# Patient Record
Sex: FEMALE | Race: WHITE | NOT HISPANIC OR LATINO | Employment: UNEMPLOYED | URBAN - METROPOLITAN AREA
[De-identification: names, ages, dates, MRNs, and addresses within clinical notes are randomized per-mention and may not be internally consistent; named-entity substitution may affect disease eponyms.]

---

## 2024-08-16 ENCOUNTER — LAB (OUTPATIENT)
Dept: LAB | Facility: LAB | Age: 64
End: 2024-08-16
Payer: OTHER GOVERNMENT

## 2024-08-16 ENCOUNTER — OFFICE VISIT (OUTPATIENT)
Dept: CARDIOLOGY | Facility: HOSPITAL | Age: 64
End: 2024-08-16
Payer: OTHER GOVERNMENT

## 2024-08-16 VITALS
HEART RATE: 71 BPM | HEIGHT: 65 IN | OXYGEN SATURATION: 92 % | DIASTOLIC BLOOD PRESSURE: 80 MMHG | SYSTOLIC BLOOD PRESSURE: 119 MMHG | WEIGHT: 223 LBS | BODY MASS INDEX: 37.15 KG/M2

## 2024-08-16 DIAGNOSIS — I48.91 ATRIAL FIBRILLATION, UNSPECIFIED TYPE (MULTI): Primary | ICD-10-CM

## 2024-08-16 DIAGNOSIS — I48.91 ATRIAL FIBRILLATION, UNSPECIFIED TYPE (MULTI): ICD-10-CM

## 2024-08-16 PROBLEM — J45.909 ASTHMA (HHS-HCC): Status: ACTIVE | Noted: 2024-08-16

## 2024-08-16 PROBLEM — D24.2 FIBROADENOMA OF LEFT BREAST: Status: ACTIVE | Noted: 2024-08-16

## 2024-08-16 LAB
ALBUMIN SERPL BCP-MCNC: 4.1 G/DL (ref 3.4–5)
ALP SERPL-CCNC: 82 U/L (ref 33–136)
ALT SERPL W P-5'-P-CCNC: 13 U/L (ref 7–45)
ANION GAP SERPL CALC-SCNC: 15 MMOL/L (ref 10–20)
AST SERPL W P-5'-P-CCNC: 16 U/L (ref 9–39)
BILIRUB SERPL-MCNC: 0.5 MG/DL (ref 0–1.2)
BUN SERPL-MCNC: 14 MG/DL (ref 6–23)
CALCIUM SERPL-MCNC: 9.5 MG/DL (ref 8.6–10.6)
CHLORIDE SERPL-SCNC: 105 MMOL/L (ref 98–107)
CO2 SERPL-SCNC: 25 MMOL/L (ref 21–32)
CREAT SERPL-MCNC: 0.87 MG/DL (ref 0.5–1.05)
EGFRCR SERPLBLD CKD-EPI 2021: 75 ML/MIN/1.73M*2
ERYTHROCYTE [DISTWIDTH] IN BLOOD BY AUTOMATED COUNT: 14.8 % (ref 11.5–14.5)
GLUCOSE SERPL-MCNC: 92 MG/DL (ref 74–99)
HCT VFR BLD AUTO: 40.9 % (ref 36–46)
HGB BLD-MCNC: 12.9 G/DL (ref 12–16)
MCH RBC QN AUTO: 27.6 PG (ref 26–34)
MCHC RBC AUTO-ENTMCNC: 31.5 G/DL (ref 32–36)
MCV RBC AUTO: 88 FL (ref 80–100)
NRBC BLD-RTO: 0 /100 WBCS (ref 0–0)
PLATELET # BLD AUTO: 320 X10*3/UL (ref 150–450)
POTASSIUM SERPL-SCNC: 3.9 MMOL/L (ref 3.5–5.3)
PROT SERPL-MCNC: 7 G/DL (ref 6.4–8.2)
RBC # BLD AUTO: 4.67 X10*6/UL (ref 4–5.2)
SODIUM SERPL-SCNC: 141 MMOL/L (ref 136–145)
WBC # BLD AUTO: 7.8 X10*3/UL (ref 4.4–11.3)

## 2024-08-16 PROCEDURE — 80053 COMPREHEN METABOLIC PANEL: CPT

## 2024-08-16 PROCEDURE — 99215 OFFICE O/P EST HI 40 MIN: CPT | Performed by: INTERNAL MEDICINE

## 2024-08-16 PROCEDURE — 99205 OFFICE O/P NEW HI 60 MIN: CPT | Performed by: INTERNAL MEDICINE

## 2024-08-16 PROCEDURE — 93005 ELECTROCARDIOGRAM TRACING: CPT | Performed by: INTERNAL MEDICINE

## 2024-08-16 PROCEDURE — 85027 COMPLETE CBC AUTOMATED: CPT

## 2024-08-16 PROCEDURE — 36415 COLL VENOUS BLD VENIPUNCTURE: CPT

## 2024-08-16 PROCEDURE — 3008F BODY MASS INDEX DOCD: CPT | Performed by: INTERNAL MEDICINE

## 2024-08-16 RX ORDER — OMEPRAZOLE 20 MG/1
20 TABLET, DELAYED RELEASE ORAL
COMMUNITY

## 2024-08-16 RX ORDER — DOFETILIDE 0.12 MG/1
125 CAPSULE ORAL EVERY 12 HOURS
COMMUNITY

## 2024-08-16 RX ORDER — ACETAMINOPHEN 500 MG
5000 TABLET ORAL DAILY
COMMUNITY

## 2024-08-16 RX ORDER — MONTELUKAST SODIUM 10 MG/1
10 TABLET ORAL NIGHTLY
COMMUNITY

## 2024-08-16 RX ORDER — ALBUTEROL SULFATE 5 MG/ML
2.5 SOLUTION RESPIRATORY (INHALATION) EVERY 6 HOURS PRN
COMMUNITY

## 2024-08-16 RX ORDER — OLOPATADINE HYDROCHLORIDE 1 MG/ML
1 SOLUTION/ DROPS OPHTHALMIC 2 TIMES DAILY
COMMUNITY

## 2024-08-16 RX ORDER — CARBOXYMETHYLCELLULOSE SODIUM 5 MG/ML
1 SOLUTION/ DROPS OPHTHALMIC AS NEEDED
COMMUNITY

## 2024-08-16 RX ORDER — ATORVASTATIN CALCIUM 10 MG/1
10 TABLET, FILM COATED ORAL DAILY
COMMUNITY

## 2024-08-16 RX ORDER — FLUTICASONE PROPIONATE 50 MCG
1 SPRAY, SUSPENSION (ML) NASAL DAILY
COMMUNITY

## 2024-08-16 RX ORDER — METOPROLOL TARTRATE 50 MG/1
50 TABLET ORAL 2 TIMES DAILY
COMMUNITY

## 2024-08-16 RX ORDER — LORATADINE 10 MG/1
10 TABLET ORAL DAILY
COMMUNITY

## 2024-08-16 ASSESSMENT — PAIN SCALES - GENERAL: PAINLEVEL: 0-NO PAIN

## 2024-08-16 NOTE — H&P (VIEW-ONLY)
Referring Provider: Laina Middleton MD  Reason for Consult: Atrial Fibrillation    History of Present Illness:      Avani Estes is a 64 y.o. year old female patient with a history significant for hypertension, paroxysmal atrial fibrillation, obstructive sleep apnea, GERD, anxiety, asthma, who is referred by Dr. Middleton for consideration of A-fib ablation.    [facility].  Her atrial arrhythmias were previously controlled on flecainide, but she eventually began to have breakthrough on flecainide of atrial arrhythmias.  In June 2024, she underwent loading with dofetilide 375 mcg.  [facility] but [facility] were unable to [facility] her until the winter, so she was thus referred here for earlier ablation.  She is interested in moving forward with ablation as soon as possible.  She continues to have palpitations despite being on dofetilide.    Focused Cardiovascular Problem List:  Paroxysmal atrial fibrillation: Currently on dofetilide, apixaban, and metoprolol.  Has failed flecainide.  Has had at least 3 cardioversions.  Typical atrial flutter by report  Hypertension  Obstructive sleep apnea  GERD      Past Medical and Surgical History:  Ms. Estes  has no past medical history on file.    has no past surgical history on file.    Social History:  Social History     Tobacco Use    Smoking status: Not on file    Smokeless tobacco: Not on file   Substance Use Topics    Alcohol use: Not on file      Tobacco: Denies  Alcohol: Denies  Drug use:  Denies      Relevant Family History:   No family history on file.  No relevant family history    Allergies:  Not on File     Medications:  Current Outpatient Medications   Medication Instructions    albuterol 2.5 mg, nebulization, Every 6 hours PRN    apixaban (ELIQUIS) 5 mg, oral, 2 times daily    atorvastatin (LIPITOR) 10 mg, oral, Daily    carboxymethylcellulose (Refresh Plus) 0.5 % ophthalmic  "solution 1 drop, Both Eyes, As needed    cholecalciferol (VITAMIN D-3) 5,000 Units, oral, Daily    dofetilide (TIKOSYN) 125 mcg, oral, Every 12 hours    fluticasone (Flonase) 50 mcg/actuation nasal spray 1 spray, Each Nostril, Daily, Shake gently. Before first use, prime pump. After use, clean tip and replace cap.    loratadine (CLARITIN) 10 mg, oral, Daily    lubricating eye drops ophthalmic solution 1 drop, Both Eyes, As needed    metoprolol tartrate (LOPRESSOR) 50 mg, oral, 2 times daily    montelukast (SINGULAIR) 10 mg, oral, Nightly    olopatadine (Patanol) 0.1 % ophthalmic solution 1 drop, Both Eyes, 2 times daily    omeprazole OTC (PRILOSEC OTC) 20 mg, oral, Daily before breakfast, Do not crush, chew, or split.         Objective   Physical Exam:  Last Recorded Vitals:      8/16/2024    11:53 AM   Vitals   Systolic 119   Diastolic 80   Heart Rate 71   Height (in) 1.651 m (5' 5\")   Weight (lb) 223   BMI 37.11 kg/m2   BSA (m2) 2.15 m2   Visit Report Report    Visit Vitals  /80 (BP Location: Left arm, Patient Position: Sitting)   Pulse 71   Ht 1.651 m (5' 5\")   Wt 101 kg (223 lb)   SpO2 92%   BMI 37.11 kg/m²   BSA 2.15 m²      Gen: NAD, sitting comfortably  HEENT: NC/AT  Card: RRR, no m/r/g  Pulm: Clear to auscultation bilaterally  Ext: No LE edema  Neuro: No focal deficits    Diagnostic Results      My Interpretation of Reviewed Study(s):  Prior ECGs (reviewed and my interpretation):   8/16/2024: Normal sinus rhythm, QTc 477 ms    Echocardiography:  4/2024: Normal LV ejection fraction no significant valvular disease.  Normal left atrial size.        Relevant Labs:  Lab Results   Component Value Date    CREATININE 0.87 08/16/2024    K 3.9 08/16/2024    HGB 12.9 08/16/2024    AST 16 08/16/2024    ALT 13 08/16/2024       Assessment/Plan   Assessment and Plan:  In summary, patient is a pleasant 64 y.o.female with a history of recurrent symptomatic drug-refractory paroxysmal atrial fibrillation diagnosed, has " failed flecainide and now on dofetilide, who presents today for initial consultation. Symptoms include palpitations and shortness of breath. Treatment options of atrial fibrillation were discussed with the patient and all questions were answered. These options included: 1. Rate control drug therapy with AVN blockers to slow down the heart rate, 2. Rhythm control with anti-arrhythmic drugs and with cardioversion to restore normal sinus rhythm, and/or 3. Radiofrequency (RF) ablation. In particular, RF ablation of atrial fibrillation was discussed in detail.    We discussed the potential benefits of AF ablation including freedom from AF without any medical therapy or, in some cases, a significant improvement in AF burden on medical therapy. We also discussed that AF ablation has been shown to be the most effective way to maintain a normal rhythm. Normal rhythm is associated with less heart failure hospitalizations, development of congestive heart failure, major adverse cardiac events, and cognitive dysfunction.     We also discussed that ablation procedure is not indicated for the purpose of discontinuing oral anticoagulation, and the success rate for catheter ablation for AF, meaning no recurrence of any atrial fibrillation, is currently approximately 70-80% for paroxysmal atrial fibrillation. A minority of patients may require an additional touch-up procedure. However, it is highly successful at reducing overall A-fib burden as well as symptomatic recurrences. The risks of the procedure were also extensively discussed, including but not limited to infection, blood vessel damage, heart injury or perforation necessitating emergency cardiac surgery, heart attack or stroke, atrial-esophageal fistula, pulmonary vein stenosis, phrenic nerve injury, even death.      Finally, the risk of stroke associated with atrial fibrillation was discussed - as the patient has a [id number]-ZCWo score of  2 , the patient will be maintained  on apixaban for CVA prevention.    After extensive discussion, Ms. Estes wishes to proceed with catheter ablation for atrial fibrillation.  We will stop dofetilide after the ablation.  If she does have recurrent atrial fibrillation during the blanking period, we could consider restarting flecainide.    Name: Avani Estes  MRN: [id number]  Attending: Nate Morfin MD  Procedure: Atrial fibrillation and atrial flutter ablation  Date desired: 8/28/2024  Diagnosis: Atrial fibrillation and typical atrial flutter  Vendor if applicable: [employer]  Expected anesthesia: General  Isolation/precautions?:  None  Other comments/med instructions: Continue apixaban uninterrupted.  Hold all other medications the morning of the procedure.         Return to Clinic:  After ablation    Thank you very much for allowing me to participate in the care of this patient. Please do not hesitate to contact me with any further questions or concerns.    Nate Morfin MD  Clinical Cardiac Electrophysiologist, [facility] [place]    of Medicine, [employer] [facility] [employer]  Director of Atrial Fibrillation Ablation, [facility]  Director of Ventricular Arrhythmias Research, [facility]  Office Phone Number: [phone]

## 2024-08-16 NOTE — PROGRESS NOTES
Referring Provider: Laina Middleton MD  Reason for Consult: Atrial Fibrillation    History of Present Illness:      Avani Estes is a 64 y.o. year old female patient with a history significant for hypertension, paroxysmal atrial fibrillation, obstructive sleep apnea, GERD, anxiety, asthma, who is referred by Dr. Middleton for consideration of A-fib ablation.    [facility].  Her atrial arrhythmias were previously controlled on flecainide, but she eventually began to have breakthrough on flecainide of atrial arrhythmias.  In June 2024, she underwent loading with dofetilide 375 mcg.  [facility] but [facility] were unable to accommodate her until the winter, so she was thus referred here for earlier ablation.  She is interested in moving forward with ablation as soon as possible.  She continues to have palpitations despite being on dofetilide.    Focused Cardiovascular Problem List:  Paroxysmal atrial fibrillation: Currently on dofetilide, apixaban, and metoprolol.  Has failed flecainide.  Has had at least 3 cardioversions.  Typical atrial flutter by report  Hypertension  Obstructive sleep apnea  GERD      Past Medical and Surgical History:  Ms. Estes  has no past medical history on file.    has no past surgical history on file.    Social History:  Social History     Tobacco Use    Smoking status: Not on file    Smokeless tobacco: Not on file   Substance Use Topics    Alcohol use: Not on file      Tobacco: Denies  Alcohol: Denies  Drug use:  Denies      Relevant Family History:   No family history on file.  No relevant family history    Allergies:  Not on File     Medications:  Current Outpatient Medications   Medication Instructions    albuterol 2.5 mg, nebulization, Every 6 hours PRN    apixaban (ELIQUIS) 5 mg, oral, 2 times daily    atorvastatin (LIPITOR) 10 mg, oral, Daily    carboxymethylcellulose (Refresh Plus) 0.5 % ophthalmic  "solution 1 drop, Both Eyes, As needed    cholecalciferol (VITAMIN D-3) 5,000 Units, oral, Daily    dofetilide (TIKOSYN) 125 mcg, oral, Every 12 hours    fluticasone (Flonase) 50 mcg/actuation nasal spray 1 spray, Each Nostril, Daily, Shake gently. Before first use, prime pump. After use, clean tip and replace cap.    loratadine (CLARITIN) 10 mg, oral, Daily    lubricating eye drops ophthalmic solution 1 drop, Both Eyes, As needed    metoprolol tartrate (LOPRESSOR) 50 mg, oral, 2 times daily    montelukast (SINGULAIR) 10 mg, oral, Nightly    olopatadine (Patanol) 0.1 % ophthalmic solution 1 drop, Both Eyes, 2 times daily    omeprazole OTC (PRILOSEC OTC) 20 mg, oral, Daily before breakfast, Do not crush, chew, or split.         Objective   Physical Exam:  Last Recorded Vitals:      8/16/2024    11:53 AM   Vitals   Systolic 119   Diastolic 80   Heart Rate 71   Height (in) 1.651 m (5' 5\")   Weight (lb) 223   BMI 37.11 kg/m2   BSA (m2) 2.15 m2   Visit Report Report    Visit Vitals  /80 (BP Location: Left arm, Patient Position: Sitting)   Pulse 71   Ht 1.651 m (5' 5\")   Wt 101 kg (223 lb)   SpO2 92%   BMI 37.11 kg/m²   BSA 2.15 m²      Gen: NAD, sitting comfortably  HEENT: NC/AT  Card: RRR, no m/r/g  Pulm: Clear to auscultation bilaterally  Ext: No LE edema  Neuro: No focal deficits    Diagnostic Results      My Interpretation of Reviewed Study(s):  Prior ECGs (reviewed and my interpretation):   8/16/2024: Normal sinus rhythm, QTc 477 ms    Echocardiography:  4/2024: Normal LV ejection fraction no significant valvular disease.  Normal left atrial size.        Relevant Labs:  Lab Results   Component Value Date    CREATININE 0.87 08/16/2024    K 3.9 08/16/2024    HGB 12.9 08/16/2024    AST 16 08/16/2024    ALT 13 08/16/2024       Assessment/Plan   Assessment and Plan:  In summary, patient is a pleasant 64 y.o.female with a history of recurrent symptomatic drug-refractory paroxysmal atrial fibrillation diagnosed, has " failed flecainide and now on dofetilide, who presents today for initial consultation. Symptoms include palpitations and shortness of breath. Treatment options of atrial fibrillation were discussed with the patient and all questions were answered. These options included: 1. Rate control drug therapy with AVN blockers to slow down the heart rate, 2. Rhythm control with anti-arrhythmic drugs and with cardioversion to restore normal sinus rhythm, and/or 3. Radiofrequency (RF) ablation. In particular, RF ablation of atrial fibrillation was discussed in detail.    We discussed the potential benefits of AF ablation including freedom from AF without any medical therapy or, in some cases, a significant improvement in AF burden on medical therapy. We also discussed that AF ablation has been shown to be the most effective way to maintain a normal rhythm. Normal rhythm is associated with less heart failure hospitalizations, development of congestive heart failure, major adverse cardiac events, and cognitive dysfunction.     We also discussed that ablation procedure is not indicated for the purpose of discontinuing oral anticoagulation, and the success rate for catheter ablation for AF, meaning no recurrence of any atrial fibrillation, is currently approximately 70-80% for paroxysmal atrial fibrillation. A minority of patients may require an additional touch-up procedure. However, it is highly successful at reducing overall A-fib burden as well as symptomatic recurrences. The risks of the procedure were also extensively discussed, including but not limited to infection, blood vessel damage, heart injury or perforation necessitating emergency cardiac surgery, heart attack or stroke, atrial-esophageal fistula, pulmonary vein stenosis, phrenic nerve injury, even death.      Finally, the risk of stroke associated with atrial fibrillation was discussed - as the patient has a [id number] score of  2 , the patient will be maintained  on apixaban for CVA prevention.    After extensive discussion, Ms. Estes wishes to proceed with catheter ablation for atrial fibrillation.  We will stop dofetilide after the ablation.  If she does have recurrent atrial fibrillation during the blanking period, we could consider restarting flecainide.    Name: Avani Estes  MRN: [id number]  Attending: Nate Morfin MD  Procedure: Atrial fibrillation and atrial flutter ablation  Date desired: 8/28/2024  Diagnosis: Atrial fibrillation and typical atrial flutter  Vendor if applicable: [employer]  Expected anesthesia: General  Isolation/precautions?:  None  Other comments/med instructions: Continue apixaban uninterrupted.  Hold all other medications the morning of the procedure.         Return to Clinic:  After ablation    Thank you very much for allowing me to participate in the care of this patient. Please do not hesitate to contact me with any further questions or concerns.    Nate Morfin MD  Clinical Cardiac Electrophysiologist, [facility] [place]    of Medicine, [employer]  Director of Atrial Fibrillation Ablation, [facility]  Director of Ventricular Arrhythmias Research, [facility]  Office Phone Number: [phone]

## 2024-08-19 LAB
ATRIAL RATE: 67 BPM
P AXIS: 70 DEGREES
P OFFSET: 166 MS
P ONSET: 122 MS
PR INTERVAL: 166 MS
Q ONSET: 205 MS
QRS COUNT: 12 BEATS
QRS DURATION: 88 MS
QT INTERVAL: 452 MS
QTC CALCULATION(BAZETT): 477 MS
QTC FREDERICIA: 469 MS
R AXIS: 14 DEGREES
T AXIS: 23 DEGREES
T OFFSET: 431 MS
VENTRICULAR RATE: 67 BPM

## 2024-08-27 ENCOUNTER — ANESTHESIA EVENT (OUTPATIENT)
Dept: CARDIOLOGY | Facility: HOSPITAL | Age: 64
End: 2024-08-27
Payer: OTHER GOVERNMENT

## 2024-08-28 ENCOUNTER — HOSPITAL ENCOUNTER (OUTPATIENT)
Facility: HOSPITAL | Age: 64
Setting detail: OUTPATIENT SURGERY
Discharge: HOME | End: 2024-08-28
Attending: INTERNAL MEDICINE | Admitting: INTERNAL MEDICINE
Payer: OTHER GOVERNMENT

## 2024-08-28 ENCOUNTER — ANESTHESIA (OUTPATIENT)
Dept: CARDIOLOGY | Facility: HOSPITAL | Age: 64
End: 2024-08-28
Payer: OTHER GOVERNMENT

## 2024-08-28 VITALS
HEIGHT: 65 IN | SYSTOLIC BLOOD PRESSURE: 156 MMHG | BODY MASS INDEX: 37.49 KG/M2 | HEART RATE: 71 BPM | DIASTOLIC BLOOD PRESSURE: 111 MMHG | OXYGEN SATURATION: 96 % | WEIGHT: 225 LBS

## 2024-08-28 DIAGNOSIS — I48.91 ATRIAL FIBRILLATION, UNSPECIFIED TYPE (MULTI): ICD-10-CM

## 2024-08-28 PROBLEM — E78.5 HYPERLIPIDEMIA: Status: ACTIVE | Noted: 2024-08-28

## 2024-08-28 PROBLEM — F32.A DEPRESSION: Status: ACTIVE | Noted: 2024-08-28

## 2024-08-28 PROBLEM — F41.9 ANXIETY: Status: ACTIVE | Noted: 2024-08-28

## 2024-08-28 PROBLEM — K21.9 GASTROESOPHAGEAL REFLUX DISEASE WITHOUT ESOPHAGITIS: Status: ACTIVE | Noted: 2024-08-28

## 2024-08-28 PROBLEM — R94.31 ABNORMAL EKG: Status: ACTIVE | Noted: 2024-08-28

## 2024-08-28 PROBLEM — G47.33 OSA (OBSTRUCTIVE SLEEP APNEA): Status: ACTIVE | Noted: 2024-08-28

## 2024-08-28 LAB
ACT BLD: 335 SEC (ref 82–174)
ACT BLD: 337 SEC (ref 82–174)
ACT BLD: 352 SEC (ref 82–174)
ACT BLD: 353 SEC (ref 82–174)
ACT BLD: 353 SEC (ref 82–174)

## 2024-08-28 PROCEDURE — 2500000005 HC RX 250 GENERAL PHARMACY W/O HCPCS: Performed by: ANESTHESIOLOGIST ASSISTANT

## 2024-08-28 PROCEDURE — 93655 ICAR CATH ABLTJ DSCRT ARRHYT: CPT | Performed by: INTERNAL MEDICINE

## 2024-08-28 PROCEDURE — 7100000009 HC PHASE TWO TIME - INITIAL BASE CHARGE: Performed by: INTERNAL MEDICINE

## 2024-08-28 PROCEDURE — 3700000001 HC GENERAL ANESTHESIA TIME - INITIAL BASE CHARGE: Performed by: INTERNAL MEDICINE

## 2024-08-28 PROCEDURE — C1732 CATH, EP, DIAG/ABL, 3D/VECT: HCPCS | Performed by: INTERNAL MEDICINE

## 2024-08-28 PROCEDURE — 93656 COMPRE EP EVAL ABLTJ ATR FIB: CPT | Performed by: INTERNAL MEDICINE

## 2024-08-28 PROCEDURE — 2500000004 HC RX 250 GENERAL PHARMACY W/ HCPCS (ALT 636 FOR OP/ED): Performed by: ANESTHESIOLOGIST ASSISTANT

## 2024-08-28 PROCEDURE — 7100000010 HC PHASE TWO TIME - EACH INCREMENTAL 1 MINUTE: Performed by: INTERNAL MEDICINE

## 2024-08-28 PROCEDURE — 2500000004 HC RX 250 GENERAL PHARMACY W/ HCPCS (ALT 636 FOR OP/ED): Performed by: INTERNAL MEDICINE

## 2024-08-28 PROCEDURE — C1760 CLOSURE DEV, VASC: HCPCS | Performed by: INTERNAL MEDICINE

## 2024-08-28 PROCEDURE — 76937 US GUIDE VASCULAR ACCESS: CPT | Performed by: INTERNAL MEDICINE

## 2024-08-28 PROCEDURE — 85347 COAGULATION TIME ACTIVATED: CPT

## 2024-08-28 PROCEDURE — 2720000007 HC OR 272 NO HCPCS: Performed by: INTERNAL MEDICINE

## 2024-08-28 PROCEDURE — C1759 CATH, INTRA ECHOCARDIOGRAPHY: HCPCS | Performed by: INTERNAL MEDICINE

## 2024-08-28 PROCEDURE — 85347 COAGULATION TIME ACTIVATED: CPT | Performed by: INTERNAL MEDICINE

## 2024-08-28 PROCEDURE — C1766 INTRO/SHEATH,STRBLE,NON-PEEL: HCPCS | Performed by: INTERNAL MEDICINE

## 2024-08-28 PROCEDURE — C1730 CATH, EP, 19 OR FEW ELECT: HCPCS | Performed by: INTERNAL MEDICINE

## 2024-08-28 PROCEDURE — 3700000002 HC GENERAL ANESTHESIA TIME - EACH INCREMENTAL 1 MINUTE: Performed by: INTERNAL MEDICINE

## 2024-08-28 PROCEDURE — P9045 ALBUMIN (HUMAN), 5%, 250 ML: HCPCS | Mod: JZ | Performed by: ANESTHESIOLOGIST ASSISTANT

## 2024-08-28 PROCEDURE — G0269 OCCLUSIVE DEVICE IN VEIN ART: HCPCS | Mod: 59 | Performed by: INTERNAL MEDICINE

## 2024-08-28 PROCEDURE — 2780000003 HC OR 278 NO HCPCS: Performed by: INTERNAL MEDICINE

## 2024-08-28 PROCEDURE — 2500000004 HC RX 250 GENERAL PHARMACY W/ HCPCS (ALT 636 FOR OP/ED): Performed by: STUDENT IN AN ORGANIZED HEALTH CARE EDUCATION/TRAINING PROGRAM

## 2024-08-28 RX ORDER — ACETAMINOPHEN 10 MG/ML
INJECTION, SOLUTION INTRAVENOUS AS NEEDED
Status: DISCONTINUED | OUTPATIENT
Start: 2024-08-28 | End: 2024-08-28

## 2024-08-28 RX ORDER — FUROSEMIDE 10 MG/ML
20 INJECTION INTRAMUSCULAR; INTRAVENOUS ONCE
Status: COMPLETED | OUTPATIENT
Start: 2024-08-28 | End: 2024-08-28

## 2024-08-28 RX ORDER — HEPARIN SODIUM 10000 [USP'U]/100ML
INJECTION, SOLUTION INTRAVENOUS CONTINUOUS PRN
Status: DISCONTINUED | OUTPATIENT
Start: 2024-08-28 | End: 2024-08-28 | Stop reason: HOSPADM

## 2024-08-28 RX ORDER — PHENYLEPHRINE 10 MG/250 ML(40 MCG/ML)IN 0.9 % SOD.CHLORIDE INTRAVENOUS
CONTINUOUS PRN
Status: DISCONTINUED | OUTPATIENT
Start: 2024-08-28 | End: 2024-08-28

## 2024-08-28 RX ORDER — PHENYLEPHRINE HCL IN 0.9% NACL 0.4MG/10ML
SYRINGE (ML) INTRAVENOUS AS NEEDED
Status: DISCONTINUED | OUTPATIENT
Start: 2024-08-28 | End: 2024-08-28

## 2024-08-28 RX ORDER — PROTAMINE SULFATE 10 MG/ML
INJECTION, SOLUTION INTRAVENOUS AS NEEDED
Status: DISCONTINUED | OUTPATIENT
Start: 2024-08-28 | End: 2024-08-28

## 2024-08-28 RX ORDER — ALBUMIN HUMAN 50 G/1000ML
SOLUTION INTRAVENOUS AS NEEDED
Status: DISCONTINUED | OUTPATIENT
Start: 2024-08-28 | End: 2024-08-28

## 2024-08-28 RX ORDER — LIDOCAINE HYDROCHLORIDE 20 MG/ML
INJECTION, SOLUTION INFILTRATION; PERINEURAL AS NEEDED
Status: DISCONTINUED | OUTPATIENT
Start: 2024-08-28 | End: 2024-08-28

## 2024-08-28 RX ORDER — ONDANSETRON HYDROCHLORIDE 2 MG/ML
INJECTION, SOLUTION INTRAVENOUS AS NEEDED
Status: DISCONTINUED | OUTPATIENT
Start: 2024-08-28 | End: 2024-08-28

## 2024-08-28 RX ORDER — PANTOPRAZOLE SODIUM 40 MG/1
40 TABLET, DELAYED RELEASE ORAL 2 TIMES DAILY
Qty: 60 TABLET | Refills: 0 | Status: SHIPPED | OUTPATIENT
Start: 2024-08-28 | End: 2024-09-27

## 2024-08-28 RX ORDER — ROCURONIUM BROMIDE 10 MG/ML
INJECTION, SOLUTION INTRAVENOUS AS NEEDED
Status: DISCONTINUED | OUTPATIENT
Start: 2024-08-28 | End: 2024-08-28

## 2024-08-28 RX ORDER — MIDAZOLAM HYDROCHLORIDE 1 MG/ML
INJECTION INTRAMUSCULAR; INTRAVENOUS AS NEEDED
Status: DISCONTINUED | OUTPATIENT
Start: 2024-08-28 | End: 2024-08-28

## 2024-08-28 RX ORDER — PROPOFOL 10 MG/ML
INJECTION, EMULSION INTRAVENOUS AS NEEDED
Status: DISCONTINUED | OUTPATIENT
Start: 2024-08-28 | End: 2024-08-28

## 2024-08-28 RX ORDER — ESMOLOL HYDROCHLORIDE 10 MG/ML
INJECTION INTRAVENOUS AS NEEDED
Status: DISCONTINUED | OUTPATIENT
Start: 2024-08-28 | End: 2024-08-28

## 2024-08-28 RX ORDER — FENTANYL CITRATE 50 UG/ML
INJECTION, SOLUTION INTRAMUSCULAR; INTRAVENOUS AS NEEDED
Status: DISCONTINUED | OUTPATIENT
Start: 2024-08-28 | End: 2024-08-28

## 2024-08-28 RX ORDER — HEPARIN SODIUM 1000 [USP'U]/ML
INJECTION, SOLUTION INTRAVENOUS; SUBCUTANEOUS AS NEEDED
Status: DISCONTINUED | OUTPATIENT
Start: 2024-08-28 | End: 2024-08-28 | Stop reason: HOSPADM

## 2024-08-28 SDOH — HEALTH STABILITY: MENTAL HEALTH: CURRENT SMOKER: 0

## 2024-08-28 ASSESSMENT — COLUMBIA-SUICIDE SEVERITY RATING SCALE - C-SSRS
1. IN THE PAST MONTH, HAVE YOU WISHED YOU WERE DEAD OR WISHED YOU COULD GO TO SLEEP AND NOT WAKE UP?: NO
2. HAVE YOU ACTUALLY HAD ANY THOUGHTS OF KILLING YOURSELF?: NO
6. HAVE YOU EVER DONE ANYTHING, STARTED TO DO ANYTHING, OR PREPARED TO DO ANYTHING TO END YOUR LIFE?: NO

## 2024-08-28 NOTE — ANESTHESIA PROCEDURE NOTES
Arterial Line:    Date/Time: 8/28/2024 12:50 PM    Staffing  Performed: CAA   Authorized by: Morales Martinez MD    Performed by: LAYA Ornelas    An arterial line was placed. Procedure performed using surface landmarks.in the OR for the following indication(s): continuous blood pressure monitoring.    A 20 gauge (size), 1 and 3/4 inch (length), Arrow (type) catheter was placed into the Left radial artery, secured by T[place]arita   Seldinger technique used.  Events:  patient tolerated procedure well with no complications.      Additional notes:  Arterial line inserted easily/atraumatically, no complications noted.

## 2024-08-28 NOTE — ANESTHESIA PROCEDURE NOTES
Airway  Date/Time: 8/28/2024 12:41 PM  Urgency: elective    Airway not difficult    Staffing  Performed: LAYA   Authorized by: Morales Martinez MD    Performed by: LAYA Ornelas  Patient location during procedure: OR    Indications and Patient Condition  Indications for airway management: anesthesia  Spontaneous Ventilation: absent  Sedation level: deep  Preoxygenated: yes  Patient position: sniffing  Mask difficulty assessment: 1 - vent by mask    Final Airway Details  Final airway type: endotracheal airway      Successful airway: ETT  Cuffed: yes   Successful intubation technique: direct laryngoscopy  Endotracheal tube insertion site: oral  Blade: Kathleen  Blade size: #3  ETT size (mm): 7.0  Cormack-Lehane Classification: grade I - full view of glottis  Placement verified by: capnometry and palpation of cuff   Cuff volume (mL): 6  Measured from: lips  ETT to lips (cm): 23  Number of attempts at approach: 1  Ventilation between attempts: none  Number of other approaches attempted: 0

## 2024-08-28 NOTE — ANESTHESIA PREPROCEDURE EVALUATION
"Patient: Avani Estes    Procedure Information       Date/Time: 08/28/24 1230    Procedure: Ablation A-Fib - carto    [place] [facility] [place] [facility]23 Cardiac Cath Lab    Providers: Nate Morfin MD            Relevant Problems   Cardiac   (+) Abnormal EKG (EKG showed long QT with fusion complexes)   (+) Atrial fibrillation (Multi)   (+) Hyperlipidemia      Pulmonary  Hx chronic sinusitis/rhinitis  Hx throat pain  Hx smoker   (+) Asthma (HHS-HCC) (Hx Reactive Airway Disease)   (+) FAUZIA (obstructive sleep apnea)      Neuro   (+) Anxiety   (+) Depression      GI  Hx internal bleeding hemorrhoids     (+) Gastroesophageal reflux disease without esophagitis      GYN   (-) Breast cancer (Multi) (Hx Left breast fibroadenoma s/p excision)     Hx chronic back/neck pain;  Occasional neuropathy of B fingers  Moderate sciatica L>R    Clinical information reviewed:  Eliquis -- last dose day of surgery at 09:00    NPO Detail:  No data recorded     Physical Exam    Airway  Mallampati: II  TM distance: >3 FB  Neck ROM: full     Cardiovascular    Dental   (+) implants     Pulmonary   Breath sounds clear to auscultation     Abdominal   (+) obese  Abdomen: soft  Bowel sounds: normal     Other findings: Several implants in mouth, all dentition solid          Anesthesia Plan    History of general anesthesia?: no  History of complications of general anesthesia?: no    ASA 3     general   (Plan GETA/ PIVx2/ A-line  Hx resistance to anesthetic, \"woke up\" during prior MAC procedures)  The patient is not a current smoker.  Patient was previously instructed to abstain from smoking on day of procedure.  Patient did not smoke on day of procedure.  Education provided regarding risk of obstructive sleep apnea.  intravenous induction   Anesthetic plan and risks discussed with patient.  Use of blood products discussed with patient who consented to blood products.    Plan discussed with CAA and attending.      "

## 2024-08-28 NOTE — POST-PROCEDURE NOTE
Physician Transition of Care Summary  Invasive Cardiovascular Lab    Procedure Date: 8/28/2024  Attending:    * Nate Morfin - Primary  Resident/Fellow/Other Assistant: Surgeons and Role:     * Pb Buckner MD - Fellow    Indications:   Pre-op Diagnosis      * Atrial fibrillation, unspecified type (Multi) [I48.91]    Post-procedure diagnosis:   Post-op Diagnosis     * Atrial fibrillation, unspecified type (Multi) [I48.91]    Procedure(s):   Ablation A-Fib  51490 - MI COMPRE EP EVAL ABLTJ ATR FIB PULM VEIN ISOLATION        Procedure Findings:   Atrial Fibrillation  Typical Atrial Flutter  Mitral Isthmus Flutter  Focal atrial tachcyardia from high RA septum    Description of the Procedure:   RFV acess x2 (8.5Fr x 2)  Patient's presenting rhythm was typical atrial flutter, however converted to atrial fibrillation at the beginning of the case  Transseptal access was performed  Cardioversion to normal sinus rhtyhm, but patient went back into atrial fibrillation  Low voltage noted in the posterior LA   WACA performed with bilateral PVI  Right-sided carinal line performed  Left atrial posterior wall isolation performed next  Given clincial history of CTI flutter, catheter was pulled back and CTI line was performed  During CTI line, the patient went into mitral isthmus flutter  An anterior mitral isthmus line was then created, with eventual bidirectional block  Focal atrial tachycardia occurred and was mapped to the high RA interatrial septum  Ablation here terminated the atrial tachycardia, but patietn then went into AF  S/p DCCVx3 back to normal sinus rhythm  50mg of Protamine given  Perclose x 2 to the groin (one failed)    Complications:   None    Stents/Implants:   Implants       No implant documentation for this case.            Anticoagulation/Antiplatelet Plan:   Continue anticoagulation uninterrupted  Continue dofetilide    Estimated Blood Loss:   5 mL    Anesthesia: General Anesthesia Staff: Anesthesiologist:  Morales Martinez MD  C-AA: LAYA Ornelas    Any Specimen(s) Removed:   No specimens collected during this procedure.    Disposition:   Back home tonight  EKG prior discharge home  20 mg IV lasix      Electronically signed by: Nate Morfin MD, 8/28/2024 4:54 PM

## 2024-08-28 NOTE — ANESTHESIA POSTPROCEDURE EVALUATION
Patient: Avani Estes    Procedure Summary       Date: 08/28/24 Room / Location: [facility] STEREO / [facility] MAT 3529 Cardiac Cath Lab    Anesthesia Start: 1218 Anesthesia Stop:     Procedure: Ablation A-Fib Diagnosis:       Atrial fibrillation, unspecified type (Multi)      (Atrial fibrillation, unspecified type (Multi) [I48.91])    Providers: Nate Morfin MD Responsible Provider: Morales Martinez MD    Anesthesia Type: general ASA Status: 3            Anesthesia Type: general    Vitals Value Taken Time   /68 08/28/24 1649   Temp 36.0 08/28/24 1649   Pulse 62 08/28/24 1649   Resp 14 08/28/24 1649   SpO2 100 08/28/24 1649       Anesthesia Post Evaluation    Patient location during evaluation: PACU  Patient participation: complete - patient participated  Level of consciousness: sleepy but conscious  Pain management: adequate  Airway patency: patent  Cardiovascular status: acceptable  Respiratory status: acceptable  Hydration status: acceptable  Postoperative Nausea and Vomiting: none  Comments: Patient SV on 8 L/min O2 w/SFM.  VSS. Report given to nurse.        No notable events documented.

## 2024-08-28 NOTE — ANESTHESIA PROCEDURE NOTES
Airway  Date/Time: 8/28/2024 12:41 PM  Urgency: elective      Staffing  Performed: LAYA   Authorized by: Morales Martinez MD    Performed by: LAYA Ornelas  Patient location during procedure: OR    Indications and Patient Condition  Indications for airway management: anesthesia  Spontaneous ventilation: present  Sedation level: deep  Preoxygenated: yes  Patient position: sniffing  Mask difficulty assessment: 2 - vent by mask + OA or adjuvant +/- NMBA    Final Airway Details  Final airway type: endotracheal airway      Successful airway: ETT  Cuffed: yes   Successful intubation technique: direct laryngoscopy  Blade: Kathleen  ETT size (mm): 7.0  Cormack-Lehane Classification: grade IIa - partial view of glottis  Placement verified by: chest auscultation and capnometry   Measured from: lips  ETT to lips (cm): 22  Number of attempts at approach: 1  Ventilation between attempts: BVM    Additional Comments  Lips and teeth in preop condition.

## 2024-08-28 NOTE — ANESTHESIA PROCEDURE NOTES
Peripheral IV  Date/Time: 8/28/2024 12:49 PM  Inserted by: Morales Martinez MD    Placement  Needle size: 16 G  Laterality: right  Location: wrist  Local anesthetic: none  Site prep: chlorhexidine  Technique: anatomical landmarks  Attempts: 1

## 2024-08-28 NOTE — DISCHARGE INSTRUCTIONS
INSTRUCTIONS AFTER ABLATION PROCEDURE:    * You will need to continue blood thinner (Eliquis) until instructed otherwise. It is important not to interrupt blood thinner for any reason (other than an emergency) during the first 30 days after ablation.    * You will be on Pantoprazole twice a day (a heartburn medicine) for 4 weeks to protect the esophagus as it can become irritated with ablation. It is very important that you take this medication. [facility] you. After 4 weeks, you can go back to your home omeprazole.    * All other medications will generally remain the same unless you are told otherwise.  Resume taking your home medications today (including blood thinner) as listed on the discharge instructions.    * In the first week post-ablation you should take it easy. No heavy lifting or heavy exercise, no treadmill. You can use the stairs if needed but go slowly and minimize the number of times up and down.    * Some minor bruising is common at each groin access site with minor soreness as if you had banged the area. Bruising may occasionally be seen to extend down the leg. This is normal as is an occasional small quarter sized bump in the area. If larger swelling or more significant pain occurs at the area, please contact the office or go the nearest Emergency Room.    * You may have some minor chest pain for the next week or so. The pain will often worsen with a deep breath and be better when leaning forward. This is pericardial chest pain from the ablation and is generally not of concern. It should resolve within a week although it might increase for a day or so after the ablation.    * If you develop unexplained fevers exceeding 100 degrees anytime within the first 3 weeks post-ablation, you need to contact the office. Low grade fevers of around 99 degrees are common in the first day or so post-ablation.    * Atrial fibrillation (AFib) can recur in all patients who  undergo this ablation for up to 4-8 weeks post-ablation. The ablation itself can cause inflammation (pericarditis) in the atria and this can cause AFib. Some patients will actually experience an increased amount of atrial arrhythmia early after ablation. Approximately 1/3 of patients will have this early recurrence of AFib. Medications should be continued and your heart rate controlled. Nothing else needs be done initially except waiting as in many cases these episodes of AFib will prove self limited.    * Continue to follow up with your primary care physician, primary cardiologist, and any other specialists you normally see.    * No driving for 2 days post procedure (IF you were driving prior to procedure)    *Diet: Heart healthy    Call Provider If:  Breathing faster than normal.     Fever of 100.4 F (38 C) or higher.     Chills.     Any new concerning symptoms.     Passing out.     Patient Instructions, Next 24 hours:  DO NOT drive a car, operate machinery or power tools.  It is recommended that a responsible adult be with you for the first 24 hours.     DO NOT drink any alcoholic drinks or take any non-prescriptive medications that contain alcohol for the first 24 hours.     DO NOT make any important decisions for the first 24 hours.    Activity:  You are advised to go directly home from the hospital.     DO NOT lift anything heavier than 10 pounds for one week, this allows for proper healing of the groin.     No excessive exercise or treadmill use for one week. You may walk and do stairs, slowly.     No sexual activities for 24 hours after you arrive home.    Wound Care:  If slight bleeding should occur at groin site, lie down and have someone apply firm pressure just above the puncture site for 5 minutes.  If it continues or is profuse, call 911. Always notify your doctor if bleeding occurs.     Keep site clean and dry. Let air dry or you may use a simple bandaid.     Gently cleanse the puncture site in your  groin with soap and water only.     You may experience some tenderness, bruising or minimal inflammation.  If you have any concerns, you may contact the EP Lab or if any of these symptoms become excessive, contact your electrophysiologist or go to the emergency room.     No tub baths, soaking, hot tubs, or swimming for one week.     May shower the next day after your procedure.    Other Instructions:  If you have any questions about the effects of the sedative drugs or groin care, call the physician who performed your procedure.    FOLLOW UP:  1) Primary care physician 2 weeks--call to schedule    2) Argelia Cassidy CNP ( Electrophysiology) 1 month after ablation   will notify you of appointment. If you haven't heard in 1 week, please call [phone]

## 2024-10-04 ENCOUNTER — OFFICE VISIT (OUTPATIENT)
Dept: CARDIOLOGY | Facility: HOSPITAL | Age: 64
End: 2024-10-04
Payer: OTHER GOVERNMENT

## 2024-10-04 VITALS
WEIGHT: 220.8 LBS | BODY MASS INDEX: 35.48 KG/M2 | OXYGEN SATURATION: 98 % | SYSTOLIC BLOOD PRESSURE: 137 MMHG | HEART RATE: 72 BPM | DIASTOLIC BLOOD PRESSURE: 86 MMHG | HEIGHT: 66 IN

## 2024-10-04 DIAGNOSIS — I48.91 ATRIAL FIBRILLATION, UNSPECIFIED TYPE (MULTI): Primary | ICD-10-CM

## 2024-10-04 PROCEDURE — 99213 OFFICE O/P EST LOW 20 MIN: CPT | Performed by: INTERNAL MEDICINE

## 2024-10-04 PROCEDURE — G2211 COMPLEX E/M VISIT ADD ON: HCPCS | Performed by: INTERNAL MEDICINE

## 2024-10-04 PROCEDURE — 93005 ELECTROCARDIOGRAM TRACING: CPT | Performed by: INTERNAL MEDICINE

## 2024-10-04 PROCEDURE — 3008F BODY MASS INDEX DOCD: CPT | Performed by: INTERNAL MEDICINE

## 2024-10-04 PROCEDURE — 1036F TOBACCO NON-USER: CPT | Performed by: INTERNAL MEDICINE

## 2024-10-04 RX ORDER — OMEPRAZOLE 20 MG/1
20 TABLET, DELAYED RELEASE ORAL
COMMUNITY
Start: 2024-03-13 | End: 2024-10-04 | Stop reason: SDUPTHER

## 2024-10-04 ASSESSMENT — PAIN SCALES - GENERAL: PAINLEVEL: 0-NO PAIN

## 2024-10-04 ASSESSMENT — PATIENT HEALTH QUESTIONNAIRE - PHQ9
SUM OF ALL RESPONSES TO PHQ9 QUESTIONS 1 AND 2: 0
2. FEELING DOWN, DEPRESSED OR HOPELESS: NOT AT ALL
1. LITTLE INTEREST OR PLEASURE IN DOING THINGS: NOT AT ALL

## 2024-10-04 NOTE — PROGRESS NOTES
Referring Provider: Laina Middleton MD  Reason for Consult: Atrial Fibrillation    History of Present Illness:      Avani Estes is a 64 y.o. year old female patient with a history significant for hypertension, paroxysmal atrial fibrillation, obstructive sleep apnea, GERD, anxiety, asthma, who is referred by Dr. Middleton for consideration of A-fib ablation.    [facility].  Her atrial arrhythmias were previously controlled on flecainide, but she eventually began to have breakthrough on flecainide of atrial arrhythmias.  In June 2024, she underwent loading with dofetilide 375 mcg.  [facility], but [facility] were unable to accommodate her until the winter, so she was thus referred here for earlier ablation.    She underwent successful ablation on 8/28/2024.  She was noted to have scattered areas of low voltage in the posterior wall.  She underwent pulmonary vein isolation, left atrial posterior wall isolation, and then developed mitral isthmus flutter during the procedure, so also underwent an anterior mitral isthmus line ablation.  Furthermore, she developed typical atrial flutter during the procedure, so we performed a CTI line.  Finally, she developed a spontaneous right sided tachycardia originating from the high dayanna terminalis that was also ablated.    In the first few weeks after the procedure, she was having intermittent palpitations and heart rates up to the 140s that would spontaneously terminate.  She also had an episode of bradycardia with heart rates down to the 40s.  Her metoprolol was then cut to 25 mg twice a day with improvement in her heart rates in the 60s.  She is otherwise doing well and for the past few weeks has not had any significant palpitations.    Focused Cardiovascular Problem List:  Paroxysmal atrial fibrillation: Currently on dofetilide, apixaban, and metoprolol.  Has failed flecainide.  Has had at least 3  cardioversions.  Extensive ablation on 8/28/2024 with pulmonary vein isolation, left atrial posterior wall isolation, and then developed mitral isthmus flutter during the procedure, so also underwent an anterior mitral isthmus line ablation.  Furthermore, she developed typical atrial flutter during the procedure, so we performed a CTI line.  Finally, she developed a spontaneous right sided tachycardia originating from the high dayanna terminalis that was also ablated.  Typical atrial flutter by report: Status post CTI line 8/28/2024  Hypertension  Obstructive sleep apnea  GERD      Past Medical and Surgical History:  Ms. Estes  has no past medical history on file.    has a past surgical history that includes Cardiac electrophysiology procedure (N/A, 8/28/2024).    Social History:  Social History     Tobacco Use    Smoking status: Never    Smokeless tobacco: Never   Substance Use Topics    Alcohol use: Never      Tobacco: Denies  Alcohol: Denies  Drug use:  Denies      Relevant Family History:   No family history on file.  No relevant family history    Allergies:  No Known Allergies     Medications:  Current Outpatient Medications   Medication Instructions    albuterol 2.5 mg, nebulization, Every 6 hours PRN    apixaban (ELIQUIS) 5 mg, oral, 2 times daily    atorvastatin (LIPITOR) 5 mg, oral, Daily    carboxymethylcellulose (Refresh Plus) 0.5 % ophthalmic solution 1 drop, Both Eyes, As needed    cholecalciferol (VITAMIN D-3) 5,000 Units, oral, Daily    dofetilide (TIKOSYN) 375 mcg, oral, Every 12 hours    fluticasone (Flonase) 50 mcg/actuation nasal spray 1 spray, Each Nostril, Daily, Shake gently. Before first use, prime pump. After use, clean tip and replace cap.    loratadine (CLARITIN) 10 mg, oral, Daily    lubricating eye drops ophthalmic solution 1 drop, Both Eyes, As needed    metoprolol tartrate (LOPRESSOR) 25 mg, oral, 2 times daily    montelukast (SINGULAIR) 10 mg, oral, Nightly    olopatadine  "(Patanol) 0.1 % ophthalmic solution 1 drop, Both Eyes, 2 times daily    omeprazole OTC (PRILOSEC OTC) 20 mg, oral, Daily before breakfast, Do not crush, chew, or split.         Objective   Physical Exam:  Last Recorded Vitals:      8/16/2024    11:53 AM 8/28/2024    11:00 AM 10/4/2024    11:10 AM   Vitals   Systolic 119 156 137   Diastolic 80 111 86   Heart Rate 71 71 72   Height (in) 1.651 m (5' 5\") 1.651 m (5' 5\") 1.664 m (5' 5.5\")   Weight (lb) 223 225 220.8   BMI 37.11 kg/m2 37.44 kg/m2 36.18 kg/m2   BSA (m2) 2.15 m2 2.16 m2 2.15 m2   Visit Report Report  Report    Visit Vitals  /86 (BP Location: Left arm, Patient Position: Sitting, BP Cuff Size: Large adult)   Pulse 72   Ht 1.664 m (5' 5.5\")   Wt 100 kg (220 lb 12.8 oz)   SpO2 98%   BMI 36.18 kg/m²   Smoking Status Never   BSA 2.15 m²      Gen: NAD, sitting comfortably  HEENT: NC/AT  Card: RRR, no m/r/g  Pulm: Clear to auscultation bilaterally  Ext: No LE edema  Neuro: No focal deficits    Diagnostic Results      My Interpretation of Reviewed Study(s):  Prior ECGs (reviewed and my interpretation):  10/4/2024: Normal sinus rhythm with arrhythmia QTc 460 ms, normal ECG  8/16/2024: Normal sinus rhythm, QTc 477 ms    Echocardiography:  4/2024: Normal LV ejection fraction no significant valvular disease.  Normal left atrial size.        Relevant Labs:  Lab Results   Component Value Date    CREATININE 0.87 08/16/2024    K 3.9 08/16/2024    HGB 12.9 08/16/2024    AST 16 08/16/2024    ALT 13 08/16/2024       Assessment/Plan   Assessment and Plan:  In summary, patient is a pleasant 64 y.o.female with a history of recurrent symptomatic drug-refractory paroxysmal atrial fibrillation diagnosed, has failed flecainide and now on dofetilide, who presents today for follow-up after recent ablation at the end of August.  She underwent extensive ablation with PVI, left atrial posterior wall isolation, anterior mitral isthmus line, CTI ablation, and ablation of a focal right " atrial tachycardia.  She had some intermittent palpitations for the first few weeks after the procedure, likely due to inflammation, but is now doing well.  She is in sinus rhythm today.    She tells me [facility] as well as a follow-up echocardiogram in a few months.  She did have a colonoscopy in a week, but I asked her to push this back to December as I would like her to be on uninterrupted anticoagulation for at least 3 months given the extensive ablation that we performed.  [facility], but I would be happy to see her in the future if the need arises.       Return to Clinic:  As needed    Thank you very much for allowing me to participate in the care of this patient. Please do not hesitate to contact me with any further questions or concerns.    Nate Morfin MD  Clinical Cardiac Electrophysiologist, [facility] [place]    of Medicine, [facility]  Director of Atrial Fibrillation Ablation, [facility]  Director of Ventricular Arrhythmias Research, [facility]  Office Phone Number: [phone]

## 2024-10-09 LAB
ATRIAL RATE: 64 BPM
P AXIS: 72 DEGREES
P OFFSET: 169 MS
P ONSET: 126 MS
PR INTERVAL: 184 MS
Q ONSET: 218 MS
QRS COUNT: 11 BEATS
QRS DURATION: 72 MS
QT INTERVAL: 446 MS
QTC CALCULATION(BAZETT): 460 MS
QTC FREDERICIA: 455 MS
R AXIS: 21 DEGREES
T AXIS: 36 DEGREES
T OFFSET: 441 MS
VENTRICULAR RATE: 64 BPM

## (undated) DEVICE — CABLE, CATH TO CARTO SYSTEM, 12 HYP/12 REDEL, YELLOW, 10FT

## (undated) DEVICE — CATHETER, DIAGNOSTIC, SOUNDSTAR ECO SMS, 8FR

## (undated) DEVICE — INTERFACE CABLE, EXISITING DX CATHETERS, BLUE PORT (REPROCESS)

## (undated) DEVICE — CABLE, CATH TO CARTO SYSTEM, 12 HYP/10 REDEL (REPROCESSED)

## (undated) DEVICE — CATHETER, THERMOCOOL SMART TOUCH, SF, D-F CURVE

## (undated) DEVICE — CABLE, CARTO 3 SYSTEM, ECO INTERFACE, 34-PIN, SPLIT HANDLE (REPROCESSED)

## (undated) DEVICE — SHEATH, GUIDING, VIZIGO, 8.5F WITH CURVE VIZ  MDC

## (undated) DEVICE — DEVICE, CLOSURE, PERCLOSE, PROSTYLE

## (undated) DEVICE — NEEDLE, NRG TRANSSEPTAL, 98CM, CURVE C0

## (undated) DEVICE — CATHETER, DEFLECTABLE TIP, 2-5-2,, D TYPE

## (undated) DEVICE — INTRODUCER, HEMOSTASIS, STR/J .038 IN, 8.5FR 12CM

## (undated) DEVICE — CATHETHER, CS, BI-DIRECTIONAL, 10 POLES, D-F TYPE

## (undated) DEVICE — CABLE, CONNECTOR, 10FT

## (undated) DEVICE — TUBING SET, IRRIGATION, SMARTABLATE

## (undated) DEVICE — CABLE, 34 HYP, 34 LEMO, 10FT, SMART TOUCH (REPROCESS)

## (undated) DEVICE — CATHETER, PENTARAY, NAV ECO, 7FR, 2-6-2 SPACING, D CURVE

## (undated) DEVICE — PAD, ELECTRODE DEFIB PADPRO ADULT STRL W/ADAPTER

## (undated) DEVICE — PATCHES, EXTERNAL REFERENCE, CARTO3